# Patient Record
(demographics unavailable — no encounter records)

---

## 2025-02-05 NOTE — PLAN
[FreeTextEntry1] : 54 yo female pt presents in office for annual exam.  Plan: - Pap smear today - Rx for mammogram and breast sonogram given - Rx for estradiol patch and progesterone po  RTO in one year for annual/PRN.

## 2025-02-05 NOTE — HISTORY OF PRESENT ILLNESS
[FreeTextEntry1] : 54 yo female pt presents in office for annual exam. G0 postmenopausal. LMP 12/28/2021. She was seen by another provider last year and was given estrogen cream and progesterone. She was off of it for two months, and her sx have reoccurred. She would like to try another form of HRT.  GynHx: Endometriosis PMHx: Cervical neck issue - treated with nerve blocks/epidurals. SHx: Laparoscopic surgery, rectal polyp removal (2017)

## 2025-02-05 NOTE — END OF VISIT
[FreeTextEntry3] : I, Ruddy Leanntimothy, acted as a scribe on behalf of Dr. Darleen Cash D.O. on 02/05/2025.  All medical entries made by the scribe were at my, Dr. Darleen Cash D.O., direction and personally dictated by me on 02/05/2025. I have reviewed the chart and agree that the record accurately reflects my personal performance of the history, physical exam, assessment and plan. I have also personally directed, reviewed, and agreed with the chart.